# Patient Record
Sex: MALE | Race: BLACK OR AFRICAN AMERICAN
[De-identification: names, ages, dates, MRNs, and addresses within clinical notes are randomized per-mention and may not be internally consistent; named-entity substitution may affect disease eponyms.]

---

## 2019-07-22 ENCOUNTER — HOSPITAL ENCOUNTER (OUTPATIENT)
Dept: HOSPITAL 25 - ED | Age: 49
Setting detail: OBSERVATION
LOS: 1 days | Discharge: HOME | End: 2019-07-23
Attending: HOSPITALIST | Admitting: INTERNAL MEDICINE
Payer: SELF-PAY

## 2019-07-22 DIAGNOSIS — Z86.73: ICD-10-CM

## 2019-07-22 DIAGNOSIS — Z79.84: ICD-10-CM

## 2019-07-22 DIAGNOSIS — G81.91: ICD-10-CM

## 2019-07-22 DIAGNOSIS — R20.2: ICD-10-CM

## 2019-07-22 DIAGNOSIS — E11.649: Primary | ICD-10-CM

## 2019-07-22 DIAGNOSIS — E78.5: ICD-10-CM

## 2019-07-22 DIAGNOSIS — Z79.01: ICD-10-CM

## 2019-07-22 DIAGNOSIS — I10: ICD-10-CM

## 2019-07-22 DIAGNOSIS — Z79.82: ICD-10-CM

## 2019-07-22 LAB
ALBUMIN SERPL BCG-MCNC: 4.6 G/DL (ref 3.2–5.2)
ALBUMIN/GLOB SERPL: 1.4 {RATIO} (ref 1–3)
ALP SERPL-CCNC: 51 U/L (ref 34–104)
ALT SERPL W P-5'-P-CCNC: 39 U/L (ref 7–52)
ANION GAP SERPL CALC-SCNC: 10 MMOL/L (ref 2–11)
AST SERPL-CCNC: 31 U/L (ref 13–39)
BASOPHILS # BLD AUTO: 0 10^3/UL (ref 0–0.2)
BUN SERPL-MCNC: 11 MG/DL (ref 6–24)
BUN/CREAT SERPL: 15.3 (ref 8–20)
CALCIUM SERPL-MCNC: 9.7 MG/DL (ref 8.6–10.3)
CHLORIDE SERPL-SCNC: 97 MMOL/L (ref 101–111)
EOSINOPHIL # BLD AUTO: 0.1 10^3/UL (ref 0–0.6)
GLOBULIN SER CALC-MCNC: 3.3 G/DL (ref 2–4)
GLUCOSE SERPL-MCNC: 163 MG/DL (ref 70–100)
HCO3 SERPL-SCNC: 28 MMOL/L (ref 22–32)
HCT VFR BLD AUTO: 42 % (ref 42–52)
HGB BLD-MCNC: 14.2 G/DL (ref 14–18)
LYMPHOCYTES # BLD AUTO: 1.2 10^3/UL (ref 1–4.8)
MCH RBC QN AUTO: 30 PG (ref 27–31)
MCHC RBC AUTO-ENTMCNC: 34 G/DL (ref 31–36)
MCV RBC AUTO: 88 FL (ref 80–94)
MONOCYTES # BLD AUTO: 0.6 10^3/UL (ref 0–0.8)
NEUTROPHILS # BLD AUTO: 5.7 10^3/UL (ref 1.5–7.7)
NRBC # BLD AUTO: 0 10^3/UL
NRBC BLD QL AUTO: 0
PLATELET # BLD AUTO: 257 10^3/UL (ref 150–450)
POTASSIUM SERPL-SCNC: 3.4 MMOL/L (ref 3.5–5)
PROT SERPL-MCNC: 7.9 G/DL (ref 6.4–8.9)
RBC # BLD AUTO: 4.77 10^6 /UL (ref 4.18–5.48)
SODIUM SERPL-SCNC: 135 MMOL/L (ref 135–145)
TROPONIN I SERPL-MCNC: 0 NG/ML (ref ?–0.04)
WBC # BLD AUTO: 7.6 10^3/UL (ref 3.5–10.8)

## 2019-07-22 PROCEDURE — 96372 THER/PROPH/DIAG INJ SC/IM: CPT

## 2019-07-22 PROCEDURE — 84439 ASSAY OF FREE THYROXINE: CPT

## 2019-07-22 PROCEDURE — 70450 CT HEAD/BRAIN W/O DYE: CPT

## 2019-07-22 PROCEDURE — 96361 HYDRATE IV INFUSION ADD-ON: CPT

## 2019-07-22 PROCEDURE — 70547 MR ANGIOGRAPHY NECK W/O DYE: CPT

## 2019-07-22 PROCEDURE — 84443 ASSAY THYROID STIM HORMONE: CPT

## 2019-07-22 PROCEDURE — 93005 ELECTROCARDIOGRAM TRACING: CPT

## 2019-07-22 PROCEDURE — 81003 URINALYSIS AUTO W/O SCOPE: CPT

## 2019-07-22 PROCEDURE — 36415 COLL VENOUS BLD VENIPUNCTURE: CPT

## 2019-07-22 PROCEDURE — 82607 VITAMIN B-12: CPT

## 2019-07-22 PROCEDURE — 83605 ASSAY OF LACTIC ACID: CPT

## 2019-07-22 PROCEDURE — 83036 HEMOGLOBIN GLYCOSYLATED A1C: CPT

## 2019-07-22 PROCEDURE — 99284 EMERGENCY DEPT VISIT MOD MDM: CPT

## 2019-07-22 PROCEDURE — 80048 BASIC METABOLIC PNL TOTAL CA: CPT

## 2019-07-22 PROCEDURE — 86140 C-REACTIVE PROTEIN: CPT

## 2019-07-22 PROCEDURE — 93306 TTE W/DOPPLER COMPLETE: CPT

## 2019-07-22 PROCEDURE — 70544 MR ANGIOGRAPHY HEAD W/O DYE: CPT

## 2019-07-22 PROCEDURE — 95819 EEG AWAKE AND ASLEEP: CPT

## 2019-07-22 PROCEDURE — G0378 HOSPITAL OBSERVATION PER HR: HCPCS

## 2019-07-22 PROCEDURE — 70551 MRI BRAIN STEM W/O DYE: CPT

## 2019-07-22 PROCEDURE — 96360 HYDRATION IV INFUSION INIT: CPT

## 2019-07-22 PROCEDURE — 80061 LIPID PANEL: CPT

## 2019-07-22 PROCEDURE — 84484 ASSAY OF TROPONIN QUANT: CPT

## 2019-07-22 PROCEDURE — 85025 COMPLETE CBC W/AUTO DIFF WBC: CPT

## 2019-07-22 PROCEDURE — 80053 COMPREHEN METABOLIC PANEL: CPT

## 2019-07-22 RX ADMIN — ATORVASTATIN CALCIUM SCH MG: 10 TABLET, FILM COATED ORAL at 21:49

## 2019-07-22 NOTE — ED
HPI Diabetic





- HPI Summary


HPI Summary: 





This pt is a 47 y/o male presenting to [facility] via EMS for low blood glucose 

level and weakness and tingling. Pt reports for the past 3 days he has noticed 

his sugar level has been dropping and when it drops he feels weak. He notes 

today he felt weak and when he checked his sugar it was in the 50s. Pt states 

he usually feels well after eating and drinking juice. Additionally reports for 

more than 3 weeks he has felt his right side tingling and weakness, including 

his whole right arm, his right leg, and his right side of torso. Tingling is 

described as pins and needles. He notes this worsens when his sugar goes down. 

Denies problems urinating, chest pain, cough, SOB.


EMS gave the pt glucose gel and upon rechecking his blood glucose it was 295. 


PMHx includes TIA, DM type 2, HTN. 





- History Of Current Complaint


Chief Complaint: EDDiabeticProb


Time Seen by Provider: 07/22/19 15:37


Hx Obtained From: Patient


Onset/Duration: Lasting Days, Still Present


Timing: Days


Severity Currently: Moderate


Aggravating: Nothing


Alleviating: Nothing


Associated Signs & Symptoms: Negative


Related History: DM II





- Allergies/Home Medications


Allergies/Adverse Reactions: 


 Allergies











Allergy/AdvReac Type Severity Reaction Status Date / Time


 


No Known Allergies Allergy   Verified 07/22/19 16:04











Home Medications: 


 Home Medications





Amiloride HCl 10 mg PO DAILY 07/22/19 [History Confirmed 07/22/19]


Amlodipine Besylate [Norvasc] 10 mg PO DAILY 07/22/19 [History Confirmed 07/22/ 19]


Aspirin 81 mg CHEW TAB* [Aspirin Low Dose TAB*] 81 mg PO DAILY 07/22/19 [

History Confirmed 07/22/19]


Clopidogrel Bisulfate [Plavix] 75 mg PO DAILY 07/22/19 [History Confirmed 07/22/ 19]


Lisinopril 10 mg PO DAILY 07/22/19 [History Confirmed 07/22/19]


Metformin HCl 1,000 mg PO BID 07/22/19 [History Confirmed 07/22/19]


Simvastatin [Zocor 5 MG-] 10 mg PO DAILY 07/22/19 [History Confirmed 07/22/19]


glipiZIDE [Glipizide] 5 mg PO DAILY 07/22/19 [History Confirmed 07/22/19]











PMH/Surg Hx/FS Hx/Imm Hx


Endocrine/Hematology History: Reports: Hx Diabetes


Cardiovascular History: Reports: Hx Hypercholesterolemia, Hx Hypertension


Neurological History: Reports: Hx Transient Ischemic Attacks (TIA)





- Immunization History


Immunizations Up to Date: Yes


Infectious Disease History: No


Infectious Disease History: 


   Denies: Traveled Outside the [place] in Last 30 Days





- Family History


Known Family History: 


   Negative: Cardiac Disease





- Social History


Alcohol Use: Occasionally


Substance Use Type: Reports: None


Smoking Status (MU): Never Smoked Tobacco





Review of Systems


Negative: Fever


Negative: Chest Pain


Negative: Cough


Positive: no symptoms reported


Positive: Weakness, Paresthesia


All Other Systems Reviewed And Are Negative: Yes





Physical Exam





- Summary


Physical Exam Summary: 





Constitutional: Well-developed, Well-nourished, Alert. (-) Distressed


Skin: Warm, Dry


HENT: Normocephalic; Atraumatic


Eyes: Conjunctiva normal


Neck: Musculoskeletal ROM normal neck. (-) JVD, (-) Stridor, (-) Tracheal 

deviation


Cardio: Rhythm regular, rate normal, Heart sounds normal; Intact distal pulses; 

The pedal pulses are 2+ and symmetric. Radial pulses are 2+ and symmetric. (-) 

Murmur


Pulmonary/Chest wall: Effort normal. (-) Respiratory distress, (-) Wheezes, (-) 

Rales


Abd: Soft, (-) tenderness, (-) Distension, (-) Guarding, (-) Rebound


Musculoskeletal: (-) Edema


Lymph: (-) Cervical adenopathy


Neuro: Alert, Oriented x3. No neurological deficits. 


Psych: Mood and affect Normal


Triage Information Reviewed: Yes


Vital Signs On Initial Exam: 


 Initial Vitals











Temp Pulse Resp BP Pulse Ox


 


 99.1 F   98   18   134/85   99 


 


 07/22/19 15:28  07/22/19 15:28  07/22/19 15:28  07/22/19 15:28  07/22/19 15:28











Vital Signs Reviewed: Yes





Diagnostics





- Vital Signs


 Vital Signs











  Temp Pulse Resp BP Pulse Ox


 


 07/22/19 15:28  99.1 F  98  18  134/85  99














- Laboratory


Result Diagrams: 


 07/22/19 16:21





 07/22/19 16:21


Lab Statement: Any lab studies that have been ordered have been reviewed, and 

results considered in the medical decision making process.





- CT


  ** Brain CT


CT Interpretation Completed By: Radiologist


Summary of CT Findings: IMPRESSION: 1. No acute intracranial abnormality (MRI 

is more sensitive for acute infarct). 2. Mild mucosal disease of the right 

sphenoid sinus. Dr. Reyes has reviewed this report.





- EKG


  ** 16:23


Cardiac Rate: NL - at 88 bpm


EKG Rhythm: Sinus Rhythm


Summary of EKG Findings: Normal [place]. Normal QRS. Normal QTc.  STs are elevated 

in V2, V3.  T waves are flattened in aVF.  Nonspecific EKG.





Re-Evaluation





- Re-Evaluation


  ** First Eval


Re-Evaluation Time: 17:57


Comment: Discussed admission plan with the pt. He understands and agrees.





Diabetic Course/Dx





- Course


Assessment/Plan: Pt is a 47 y[facility] blood 

glucose level for the past 3 days and right sided weakness and tingling for 

more than 3 weeks. PMHx includes DM type 2, HTN, TIA.  POC glucose was 320.  

Test results remarkable for potassium of 97, glucose of 163, lactic acid of 

3.8. Urine shows 3+ glucose.  Brain CT shows 1. No acute intracranial 

abnormality (MRI is more sensitive for acute infarct). 2. Mild mucosal disease 

of the right sphenoid sinus.  In the ED course the pt was given IV fluids and 

potassium chloride.  Discussed the case with Dr. Fernández, neurologist, who 

recommends admission. Discussed with Dr. Georges, hospitalist, who accepted the 

pt for admission.





- Diagnoses


Provider Diagnoses: 


 Stroke








- Physician Notifications


Discussed Care Of Patient With: Sanaz Fernández


Time Discussed With Above Provider: 17:52


Instructed by Provider To: Other - Discussed with Dr. Fernández, neurologist, who 

recommends admission to the hospitalist. [18:56] Discussed with Dr. Georges, 

hospitalist, who accepted the pt for admission.





Discharge





- Sign-Out/Discharge


Documenting (check all that apply): Patient Departure - Admit to [facility]


Patient Received Moderate/Deep Sedation with Procedure: No





- Discharge Plan


Condition: Stable


Disposition: ADMITTED TO [facility]


Referrals: 


No Primary Care Phys,NOPCP [Primary Care Provider] - 





- Billing Disposition and Condition


Condition: STABLE


Disposition: Admitted to [facility]





- Attestation Statements


Document Initiated by Scribe: Yes


Documenting Scribe: Herlinda Bonner


Provider For Whom Scribe is Documenting (Include Credential): Amanda Sams MD


Scribe Attestation: 


I, Herlinda Bonner, scribed for Amanda Magaña MD on 07/22/19 at 1825. 


Scribe Documentation Reviewed: Yes


Provider Attestation: 


The documentation as recorded by the scribe, Herlinda Bonner accurately reflects 

the service I personally performed and the decisions made by me, Amanda Magaña MD


Status of Scribe Document: Viewed

## 2019-07-22 NOTE — HP
*** ADDENDUM NOW INCLUDED ON THIS REPORT ***



HISTORY AND PHYSICAL:

 

DATE OF ADMISSION:  07/22/19

 

PROVIDER:  Archana Wells NP

 

PRIMARY CARE PROVIDER:  [place], [place].

 

ATTENDING PHYSICIAN WHILE IN THE HOSPITAL:  Dr. Carmine Alston * (dictated by 
Archana Wells NP).

 

CHIEF COMPLAINT:

1.  Hypoglycemia.

2.  Right-sided tingling.

 

HISTORY OF PRESENT ILLNESS:  Mr. Bojorquez is a 48-year-old gentleman with past 
medical history significant for type 2 diabetes, hypertension, hyperlipidemia, 
history of TIA and questionable CVA in December 2016 with residual right-sided 
heaviness per the patient, who presented to the emergency room for evaluation 
of hypoglycemia and increased tingling and heaviness on the right side.  The 
patient reports that his care visiting from out of [facility], he currently resides 
in [place].  He reports that 3 days ago, he started having episodes of low 
blood sugar.  He states that approximately in noon every day he becomes weak 
and dizzy and when he checks his blood sugar, his blood sugar is in the 50s.  
He reports that he takes sugar and candy and drinks soda and his blood sugar 
would rise and then the symptoms would resolve.  Patient reports that again 
today, he became dizzy, felt weak.  He checked his blood sugar, it was 50.  
Again, he ate candies and drank soda and his sugar was 120.  Patient reports 
that again he felt dizzy.  He was talking on the phone, he felt like he was 
going to pass out.  He checked his blood sugar and it was 50.

 

The patient also reports that he has had twitching and a sensation of tingling 
and heaviness on the right side that has been significantly worse for the last 
4 to 5 weeks.  Due to these symptoms, he presented to the emergency room for 
further evaluation.

 

PAST MEDICAL HISTORY:  Significant for:

1.  Type 2 diabetes.

2.  Hypertension.

3.  Hyperlipidemia.

4.  History of TIA in December 2016.

 

PAST SURGICAL HISTORY:  None.

 

HOME MEDICATIONS:  Include:

1.  Metformin 1000 mg p.o. b.i.d.

2.  Glimepiride 4 mg p.o. daily.

3.  Amiloride 10 mg p.o. daily.

4.  Amlodipine 10 mg p.o. daily.

5.  Plavix 75 mg p.o. daily.

6.  Aspirin 81 mg p.o. daily.

7.  Lisinopril 10 mg p.o. at h.s.

8.  Simvastatin 10 mg p.o. at h.s.

9.  Multivitamin 1 tab p.o. daily.

 

ALLERGIES:  No known drug allergies.

 

FAMILY HISTORY:  Mother and father both with a history of hypertension.  Mother 
with diabetes.  No reported history of cancer.

 

SOCIAL HISTORY:  The patient denies any tobacco or illicit drug use.  He does 
report a rare alcohol use. He is .  Surrogate decision maker in the 
event he is unable to make his own decisions is his wife or his sister.  He is 
a full code.

 

REVIEW OF SYSTEMS:  The patient denies any fever, chills, unintended weight 
loss. Denies any chest pain or edema.  Denies any cough, hemoptysis, or 
shortness of breath.  No nausea, vomiting, diarrhea, or abdominal pain, 
hematuria, or dysuria. He denies any focal weakness.  He does complain of 
tingling and heaviness feeling to the right side of his body including arm and 
leg, but no sensory loss. Sensation is intact to the right side of his body in 
full.  He does have full range of motion.  Denies any visual changes and 
dizziness.  Denies any headache.  Denies any dysphagia.  Denies any arthralgias
, myalgias, rashes, lesions, or open sores. Denies any psychosis or anxiety.

 

                               PHYSICAL EXAMINATION

 

GENERAL: At this time, Mr. Bojorquez is a 48-year-old gentleman.  He is alert and 
oriented, resting on the stretcher in the emergency room.  He is in no acute 
distress.

 

VITAL SIGNS:  Blood pressure 130/79, heart rate 88, respirations 19, O2 
saturation 99%, room air, temperature 99.1.

 

HEENT:  Head is atraumatic, normocephalic.  Eyes: EOMs are intact.  Sclerae 
anicteric and not pale. Oral mucosa appeared to be moist.

 

NECK:  Supple.

 

LUNGS:  Clear to auscultation bilaterally.  No wheezes, rales, or rhonchi.

 

CARDIAC:  S1, S2. Regular rate and rhythm.  No murmurs, rubs, or gallops.

 

ABDOMEN:  Soft, nontender.  Bowel sounds are present x4.

 

EXTREMITIES:  He is able to move all 4 extremities.  There is no clubbing or 
cyanosis.  Pedal pulses are +2 bilaterally.  He does not have any edema.

 

NEUROLOGIC:  He is awake, alert, oriented x3.  Speech is clear.  Thought 
process intact.  There is no gross focal deficits.  There is no pronator drift.
  There is no leg drift.  Push-pull is intact.  Hand  are equal.  Smile is 
equal.  Tongue is midline.  There is no facial asymmetry.  Finger-to-nose is 
intact.  There is no gross focal neuro deficits.

 

SKIN: Intact.

 

 LABORATORY DATA AND DIAGNOSTIC STUDIES:  WBCs are 7.6, RBC is 4.77, hemoglobin 
14.2, hematocrit of 42, platelet count 257.  Sodium 135, potassium 3.4, 
chloride 107, carbon dioxide is 28, anion gap is 10, BUN was 11, creatinine 0.72
, glucose was 163.  Repeat in the emergency room 113.  Lactic acid initially 
was 3.8, calcium 9.7.  ASTs were 31, ALTs were 39, alkaline phosphatase was 51. 
Troponin was 0.00. C-reactive protein was less than 1.  Urine was within normal 
limits with the exception to glucose, was 3+.

 

He had a CT of the brain, radiologist's impression:  No acute intracranial 
abnormality.  Mild mucosal disease on the right sphenoid sinus.

 

He had an electrocardiogram, which showed sinus rhythm at a rate of 88.  No ST 
or T wave changes.

 

ASSESSMENT AND PLAN:  Mr. Bojorquez is a 48-year-old gentleman with a past medical 
history significant for hypertension, hyperlipidemia, type 2 diabetes, history 
of transient ischemic attack, questionable cerebrovascular accident in 2016, 
who presented to the emergency room with complaints of hypoglycemia and 
tingling and heaviness on the right side of his body.  He will be admitted to 
rule out cerebrovascular accident under observation.

 

1.  Hypoglycemia.  The patient has had several episodes of hypoglycemia over 
the past 3 days and 2 episodes of blood sugars in the 50s today.  Initially, on 
admission to the emergency room, he had a blood sugar of 320 and repeat at 07:
30 this evening, his blood sugar was 113.  He will place him on q.2 hour Accu-
Cheks and monitor him overnight.  If his blood sugar should continue to drop, 
we will place him on D5 and monitor his blood sugars closely.  I will hold his 
metformin and glimepiride  at this time.  I am not going to place him on a 
lispro sliding scale as the patient has had several episodes of hypoglycemia.  
If he should become hyperglycemic, we will consider ordering Lispro sliding 
scale at that time.

2.  Right-sided heaviness and tingling.  We will do a transient ischemic attack 
workup.  I have consulted Neurology, Dr. Fernández, who will see the patient see 
the in consultation tomorrow.  He has requested an MRA of the head and neck and 
an MRI of the brain, which have been ordered.  I will get a transthoracic 
echocardiogram with bubble study.  We will place him on neuro checks q.4 hours. 
We will continue him on Plavix 75, aspirin 81 mg and simvastatin at 10 mg p.o. 
daily.

3.  Hypertension.  The patient will continue on amiloride, lisinopril, and 
amlodipine as per his home medications.

4.  Type 2 diabetes.  Patient will have Accu-Cheks q.2 hours.  Due to 
hypoglycemia, I will hold his metformin and glimepiride at this time.  I will 
hold off on placing the patient on Lispro sliding scale as the patient has had 
several episodes of hypoglycemia.  I will add a hemoglobin A1c for the morning 
as well.

5.  High cholesterol.  The patient will continue on simvastatin 10 mg p.o. 
daily. We will repeat a lipid profile in the a.m.

6.  History of transient ischemia attack, possible cerebrovascular accident, 
the patient will continue on Plavix and aspirin as previously prescribed.

7.  FEN:  He can have a consistent carb diet.

8.  Code status:  He is full code.

9.  DVT prophylaxis:  I will place him on Lovenox 40 mg subcu daily.

 

TIME SPENT:  Time spent on this admission was approximately 60 minutes, greater 
than half that time was spent at the bedside reviewing the events leading thus 
far to his hospitalization, performing physical exam, reviewing my plan of 
care. I have discussed this with my attending, Dr. Carmine Alston.  He is in 
agreement with my plan.

 

____________________________________ ARCHANA WELLS NP

 

 

ADDENDUM:  



Lactic acidosis.  The patient does have an elevated lactic acid.  The patient 
has no current signs of underlying infection.  His urine was within normal 
limits.  He has no cough or congestion.  No fevers.  I will add on a chest x-ray
, but at this time has no acute source of infection.  I suspect this could be 
related to his hypoglycemia.

 

 ____________________________________ ARCHANA DEB, NP

 



[id number] #: [id number]

[id number] #: [id number]

[facility]

## 2019-07-22 NOTE — HP
HISTORY AND PHYSICAL:

 

ADDENDUM:  Lactic acidosis.  The patient does have an elevated lactic acid.  
The patient has no current signs of underlying infection.  His urine was within 
normal limits.  He has no cough or congestion.  No fevers.  I will add on a 
chest x-ray, but at this time has no acute source of infection.  I suspect this 
could be related to his hypoglycemia.

 

 ____________________________________ BIN BOYD, NP

 

[id number] #: [id number]

[facility]

## 2019-07-23 ENCOUNTER — HOSPITAL ENCOUNTER (EMERGENCY)
Dept: HOSPITAL 25 - ED | Age: 49
Discharge: HOME | End: 2019-07-23
Payer: SELF-PAY

## 2019-07-23 VITALS — DIASTOLIC BLOOD PRESSURE: 81 MMHG | SYSTOLIC BLOOD PRESSURE: 132 MMHG

## 2019-07-23 VITALS — DIASTOLIC BLOOD PRESSURE: 68 MMHG | SYSTOLIC BLOOD PRESSURE: 111 MMHG

## 2019-07-23 DIAGNOSIS — E11.9: ICD-10-CM

## 2019-07-23 DIAGNOSIS — E78.00: ICD-10-CM

## 2019-07-23 DIAGNOSIS — R55: Primary | ICD-10-CM

## 2019-07-23 DIAGNOSIS — I10: ICD-10-CM

## 2019-07-23 DIAGNOSIS — Z79.899: ICD-10-CM

## 2019-07-23 LAB
ANION GAP SERPL CALC-SCNC: 7 MMOL/L (ref 2–11)
BUN SERPL-MCNC: 7 MG/DL (ref 6–24)
BUN/CREAT SERPL: 11.9 (ref 8–20)
CALCIUM SERPL-MCNC: 9.1 MG/DL (ref 8.6–10.3)
CHLORIDE SERPL-SCNC: 107 MMOL/L (ref 101–111)
CHOLEST SERPL-MCNC: 111 MG/DL
GLUCOSE SERPL-MCNC: 119 MG/DL (ref 70–100)
HCO3 SERPL-SCNC: 26 MMOL/L (ref 22–32)
HDLC SERPL-MCNC: 45.4 MG/DL
POTASSIUM SERPL-SCNC: 4 MMOL/L (ref 3.5–5)
SODIUM SERPL-SCNC: 140 MMOL/L (ref 135–145)
TRIGL SERPL-MCNC: 97 MG/DL
TSH SERPL-ACNC: 1.33 MCIU/ML (ref 0.34–5.6)

## 2019-07-23 PROCEDURE — 99283 EMERGENCY DEPT VISIT LOW MDM: CPT

## 2019-07-23 PROCEDURE — 93005 ELECTROCARDIOGRAM TRACING: CPT

## 2019-07-23 RX ADMIN — ATORVASTATIN CALCIUM SCH MG: 10 TABLET, FILM COATED ORAL at 08:53

## 2019-07-23 NOTE — ECHO
*[facility]*

[place] [address]

Phone: [phone]

Fax #: [phone]



-------------------------------------------------------------------

Transthoracic Echocardiogram



Patient: Gaurav Bojorquez                        MRN:        [id number]

:     1970                         Study Date: 2019

Age:     48                                 Accession#: [id number]

Gender:  M                                  HR:         57 bpm

Height:  71 in /180.3 cm                    BSA:        2.21 m^2

Weight:  209.6 lb /95.3 kg                  BMI:        29.3 kg/m^2



*Sonographer: * Melinda Kilpatrick RDCS RN

 

*Referring Physician: * Archana Wells

*Reading Physician: * Luis Eduardo Perry MD



-------------------------------------------------------------------

Indications:   TIA.



-------------------------------------------------------------------

History:   Transient ischemic attack.  Risk factors:  Hypertension.

Diabetes mellitus. Dyslipidemia.



-------------------------------------------------------------------

Conclusions



Summary:



- Left ventricle: Systolic function is normal. The estimated

  ejection fraction is 55-60%. Wall motion is normal; there are no

  regional wall motion abnormalities.

- Atrial septum: There is a patent foramen ovale by agitated

  contrast. The bubble study is positive. Images 1-3.

- Mitral valve: There is trace regurgitation.

- Aortic valve: There is no evidence of stenosis. There is trace

  regurgitation.

- Tricuspid valve: There is mild regurgitation.

- Pericardium, extracardiac: There is no pericardial effusion.

- Pulmonary arteries: Systolic pressure is at the upper limits of

  normal, estimated to be 35 mm Hg.

- Study data: No prior study is available for comparison.



-------------------------------------------------------------------

Study data:  Transthoracic echocardiogram.  Procedure:

Transthoracic echocardiography was performed. Image quality was

fair. Intravenous agitated saline was administered. A bubble study

was performed on Images 1-3.  Complete 2D, spectral Doppler, and

color flow Doppler.  Location:  Bedside.  Patient status:

Observation. Patient room number: 446-01. No prior study is

available for comparison.  Rhythm:  Bradycardia.



-------------------------------------------------------------------

Findings



Left ventricle:  The cavity size is normal. Wall thickness is

mildly increased. Systolic function is normal. The estimated

ejection fraction is 55-60%. Wall motion is normal; there are no

regional wall motion abnormalities. Left ventricular diastolic

function parameters are normal.

Right ventricle:  The cavity size is normal. Systolic function is

normal.

Left atrium:  The atrium is normal in size.

Right atrium:  The atrium is normal in size.

Atrial septum:  There is a patent foramen ovale by agitated

contrast. The bubble study is positive. Images 1-3.

Mitral valve:  The leaflets are mildly thickened.  There is no

evidence of stenosis.   There is trace regurgitation.

Aortic valve:   The valve is trileaflet.    There is no evidence of

stenosis.   There is trace regurgitation.

Tricuspid valve:   The valve is structurally normal.    There is no

evidence of stenosis.   There is mild regurgitation.

Pulmonic valve:    The valve is structurally normal.    There is no

evidence of stenosis.   There is mild regurgitation.

Aorta:  Aortic root: The aortic root is appears normal.

Ascending aorta: The ascending aorta is not dilated.

Aortic arch: The aortic arch is not dilated.

Pericardium:  There is no pericardial effusion.

Pulmonary arteries:

The main pulmonary artery is normal-sized. Systolic pressure is at

the upper limits of normal, estimated to be 35 mm Hg.

Systemic veins:

Inferior vena cava: The vessel is mildly dilated. There is (&gt;= 50%)

respiratory change in the IVC dimension.



-------------------------------------------------------------------

Measurements



 Left ventricle              Value        Ref       Aortic valve              Value       Ref

 FREDY, LAX                    4.5   cm     4.2 -     Peak v, S                 1.6   m/sec -----

                                          5.8       VTI, S                    37.4  cm    -----

 ESD, LAX                    3.2   cm     2.5 -     Mean grad, S              6.6   mm Hg -----

                                          4.0       Peak grad, S              10.2  mm Hg -----

 FS, LAX                     29    %      25 - 43   LVOT/AV, VTI ratio        0.7         -----

 PW, ED              (H)     1.2   cm     0.6 -

                                          1.0       Mitral valve              Value       Ref

 IVS/PW, ED                  0.97         --------  Peak E                    1.03  m/sec -----

 E&apos;, lat joe, TDI            12.0  cm/sec &gt;=10.0    Peak A                    0.82  m/sec ---
--

 E/e&apos;, lat joe, TDI          9            --------  Decel time                235   ms    -----

 E&apos;, med joe, TDI            12.0  cm/sec &gt;=7.0     Peak grad, D              4.3   mm Hg ---
--

 E/e&apos;, med joe, TDI          9            --------  Peak E/A ratio            1.25        -----

 E&apos;, avg, TDI                12.0  cm/sec --------

 E/e&apos;, avg, TDI              9            &lt;=14      Pulmonic valve            Value       Ref


                                                    Peak v, S                 1.05  m/sec -----

 LVOT                        Value        Ref       Peak grad, S              4.4   mm Hg -----

 Peak ambreen, S                 1.23  m/sec  --------

 VTI, S                      26.4  cm     --------  Tricuspid valve           Value       Ref

 Peak grad, S                6     mm Hg  --------  TR peak v                 2.6   m/sec &lt;=2.8

 Mean grad, S                4     mm Hg  --------  Peak RV-RA grad, S        27    mm Hg -----

 

 Ventricular septum          Value        Ref       Aortic root               Value       Ref

 IVS, ED             (H)     1.2   cm     0.6 -     Root diam                 3.3   cm    &lt;4.3

                                          1.0

                                                    Ascending aorta           Value       Ref

 Right ventricle             Value        Ref       AAo AP diam, S            3.7   cm    -----

 FREDY, LAX                    2.8   cm     --------

 FREDY minor ax, A4C   (H)     3.9   cm     1.9 -     Aortic arch               Value       Ref

 mid                                      3.5       Arch diam                 2.9   cm    -----

 Pressure, S                 35    mm Hg  --------

                                                    Decending aorta           Value       Ref

 Left atrium                 Value        Ref       Jomar peak ambreen              0.87  m/sec -----

 SI dim ES, LAX              3.7   cm     --------

 ML dim, A4C                 4.1   cm     --------  Pulmonary artery          Value       Ref

 SI dim, A4C                 5.3   cm     --------  Pressure, S               30.6  mm Hg -----

 Vol, ES, 2-p                60    ml     --------

 Vol/bsa, ES, 2-p            27    ml/m^2 16 - 34   Inferior vena cava        Value       Ref

                                                    Diam                      2.3   cm    -----

 Right atrium                Value        Ref

 ML dim, ES, A4C             3.8   cm     2.6 -

                                          4.4

 SI dim, ES, A4C             5.1   cm     3.4 -

                                          5.3

 Estimated RAP               8     mm Hg  --------

 

Legend:

(L)  and  (H)  pamela values outside specified reference range.



Prepared and electronically signed by



Luis Eduardo Perry MD

2019 11:53

## 2019-07-23 NOTE — PN
Hospitalist Progress Note


Date of Service: 07/23/19





Called by nursing for low blood sugar of 59 , patient was given food and repeat 

blood sugar was 126.  Side consult to Dr. Miranda - medications reviewed and 

advised that this is a delayed reaction from his glimepiride.  Reports that 

patient could experience low blood sugars over the next couple of days and 

should eat complex carbohydrates and check his blood sugar.





Patient was advised never to take glimepiride do to having hypogylcemia as per 

Dr. Miranda recommendation





Patient was advised to eat complex carbohydrates, such as peanut butter, 

proteins and not simple sugars such as candies.    patient verbalized 

understanding.





Patient advised to stop both glimepiride and metformin

## 2019-07-23 NOTE — DS
DISCHARGE SUMMARY:

 

DATE OF ADMISSION:  07/22/19

 

DATE OF DISCHARGE:  07/23/19

 

PROVIDER:  Bin Wells NP.

 

PRIMARY CARE PROVIDER:  Out of town in the [facility].

 

ATTENDING PHYSICIAN WHILE IN THE HOSPITAL:  Dr. Valery Jane * (dictated by 
Bin Wells NP).

 

PRIMARY DIAGNOSES:

1.  Hypoglycemia.

2.  Right-sided heaviness.

 

SECONDARY DIAGNOSES:

1.  Type 2 diabetes.

2.  Hypertension.

3.  Hyperlipidemia.

4.  History of transient ischemic attack/cerebrovascular accident in 2016.

 

STUDIES COMPLETED WHILE IN THE HOSPITAL:  He had a transthoracic echocardiogram 
which showed left systolic function was normal.  Estimated ejection fraction 
was 55% to 60%.  There was no regional wall motion abnormalities.  In the 
atrial septum, there is a patent foramen ovale, positive bubble study, mitral 
valve trace regurgitation.  There is no evidence of stenosis in the aortic 
valve.  There was trace regurgitation.  Tricuspid valve, there was mild 
regurgitation.  No pericardial effusion. Pulmonary artery systolic pressure was 
upper limits of normal. He had a CT of the brain; radiologist impression:  No 
acute intracranial abnormalities.  He had an MRI of the head and neck; 
radiologist impression:  No hemodynamically significant stenosis or large 
vessel occlusion.  He had an MRI of the neck, no evidence of hemodynamically 
significant  stenosis.  He had an MRI of the brain, no acute intracranial 
abnormalities were seen.

 

DISCHARGE MEDICATIONS:  Discontinued home medications:

1.  Glimepiride 5 mg p.o. daily.

2.  Metformin 1000 mg p.o. b.i.d. Continued home medications:

1.  Amlodipine 10 mg p.o. daily.

2.  Amiloride 10 mg p.o. daily.

4.  Simvastatin 10 mg p.o. daily.

5.  Lisinopril was decreased to 5 mg p.o. daily.

6.  Clopidogrel 75 mg p.o. daily.

7.  Aspirin 81 mg p.o. daily.

8.  Acetaminophen 650 mg p.o. q.6 hours as needed for pain.

 

HISTORY OF PRESENT ILLNESS AND HOSPITAL COURSE:  Mr. Bojorquez is a 48-year-old 
gentleman with a past medical history significant for  hypertension, 
hyperlipidemia, type 2 diabetes and history of TIA/CVA in 2016, who presented 
to the emergency room with complaints of dizziness, weakness and low blood 
sugars, also complaining of right-sided heaviness x4 to 5 weeks.  The patient 
reports that he is from out of town and currently resides in [place].  He is 
here on vacation. He reports that over the past 3 days he has been having 
episodes of low blood sugars.  He states that this usually occurs at noon every 
day and he checks his blood sugars and that is in the 50s, he takes candy and 
soda and his blood sugar rises and the symptoms do resolve.  The patient 
reports that again today he felt dizzy and weak.  He checked his blood sugar 
again, it was 50.  He ate candies and drink soda.  The sugar was up to 120.  
Due to the recurrence of these symptoms and feeling dizziness along with the 
right side of his body feeling heavy, he presented to the emergency room for 
further evaluation.

 

While in the hospital, the patient had q.2 hour Accu-Chek and his blood sugars 
remained within normal limits ranging from 87 to 250.  He did this afternoon at 
1623 have a low blood sugar of 59.  Repeat was 139 and repeat an hour later was 
175.  I did contact Dr. Stone from Endocrinology to discuss the patient's 
medications who advised that the hypoglycemia was likely a result of his 
glimepiride and that his metformin and glimepiride should be discontinued.  At 
the time of discharge, the patient reports that he is feeling fine.  He denies 
any dizziness or weakness.  At this time, he is stable for discharge home.

 

REVIEW OF SYSTEMS:  The patient denies any fever, chills, nausea, vomiting or 
diarrhea.  Denies any abdominal pain.  Denies any chest pain or shortness of 
breath.  He does continue to complain of heaviness on the right side that is 
unresolved.  He denies any urinary frequency, urgency.

 

PHYSICAL EXAMINATION:  General:  At this time, Mr. Bojorquez is alert and oriented.
  He is in no acute distress.  Vital Signs:  Blood pressure 111/68, heart rate 
is 65, respirations 17, O2 saturation 97%, temperature is 98.1.  HEENT:  Head 
is atraumatic, normocephalic.  Eyes:  EOMs are intact.  Sclerae anicteric and 
not pale.  Oral mucosa appeared to be moist.  Neck is supple.  Lungs are clear 
to auscultation bilaterally.  No wheezes, rales or rhonchi.  Cardiac:  S1, S2, 
regular rate and rhythm.  No murmurs, rubs or gallops.  Abdomen:  Soft and 
nontender. Bowel sounds are present x4.  Neurologic:  He is awake, alert and 
oriented x3.  His speech is clear.  Thought process is intact.  Handgrips are 
equal. Tongue is midline.  Smile is equal.  There is no pronator drift.  There 
is no leg drift. There is equal strength in all 4  extremities.  There are no 
gross focal deficits. Skin is intact.

 

At this time, Mr. Bojorquez is stable for discharge home.

 

DISCHARGE PLAN:  Mr. Bojorquez will be discharged home.  Activity as tolerated.

 

1.  Hypoglycemia.  The patient has been instructed to discontinue metformin and 
glimepiride.  I did review these medications with him.  I did instruct the 
patient that he may have subsequent low blood sugars over the next few days as 
a delayed reaction of the glimepiride.  I instructed the patient to eat complex 
carbohydrates such as peanut butter and toast, chicken, and stay away from 
cakes and juices as this will cause fast rise and also cause him to have low 
blood sugars.  I instructed the patient to check his blood sugar every 4 hours 
and to eat 3 meals a day with snacks in between over the next few days.

2.  Hypertension.  The patient should continue on his amlodipine and amiloride.
  I did decrease his lisinopril to 5 mg at night.  He should maintain a blood 
pressure between 120 and 140.  He should follow up with his primary care doctor 
for further management and decreasing his blood pressure medications.

3.  History of transient ischemic attack versus cerebrovascular accident.  The 
patient was seen in consultation by Neurology during this hospitalization, who 
recommended the patient continue on Plavix and aspirin for 30 days and then 
monotherapy with Plavix 75 mg p.o. daily after the 30 days.

4.  Patent foramen ovale.  The patient does have a patent foramen ovale that 
was seen on echocardiogram.  He should follow up with Cardiology within 1 month 
for further recommendations on possible closure of the patent foramen ovale.

 

The patient was instructed to return to the emergency room for further episodes 
of uncontrolled hypoglycemia, chest pain, shortness of breath, dizziness, 
weakness on one side or any other concerning symptoms.

 

I did discuss with my attending, Dr. Valery Jane.  She is in agreement with 
my plan.

 

____________________________________ BIN WELLS, NP

 

[id number]/[facility] #: [id number]

[facility]

## 2019-07-23 NOTE — EEG
ELECTROENCEPHALOGRAPHY:

 

DATE OF SERVICE:  19 - ROOM #446

 

DATE READ:  19

 

ORDERED BY:  Sanaz Fernández MD

 

INDICATION:  Mr. Bojorquez is a 48-year-old man with symptoms of stiffness in the 
right upper and lower extremities.  This EEG was obtained to evaluate for 
epileptiform abnormalities or electrographic seizures.

 

MEDICATIONS:

1.  Lovenox.

2.  Prinivil.

3.  Midamor.

4.  Norvasc.

5.  Aspirin.

6.  Lipitor.

7.  Plavix.

8.  Tylenol.

 

DURATION OF THE [id number]

 

REPORT:  The waking background showed appropriate organization with clearly 
defined anterior-posterior voltage and frequency gradients.  There was a well-
defined posterior dominant rhythm of 10 Hz which was symmetrical and showed 
normal reactivity.  Anteriorly, there was an expected pattern of lower voltage, 
irregular, mixed fast frequency.  Attenuation of the occipital rhythm 
accompanied drowsiness. There were irregular vertex waves, high amplitude K-
complex and sleep spindles seen throughout the recording.  There were 
predominantly bifrontal theta and delta slowing seen throughout the recording 
that was thought to be related to K- complexes.

 

Single electrode EKG showed normal sinus rhythm with a rate of 70 beats per 
minute. Throughout the recording, there were no epileptiform discharges.

 

CLINICAL IMPRESSION:  This is an essentially normal awake and sleep EEG with 
irregular K-complex and sleep spindles seen throughout the recording and 
possible high amplitude delta waves seen in the frontal region.  These high 
amplitude delta waves were seen predominantly during sleep state and could be a 
normal finding, but underlying frontal intermittent rhythmic delta activity 
that can be seen with mild global encephalopathy cannot be entirely excluded.  
Clinical correlation is recommended.

 

[id number]/[facility] #: [id number]

[facility]

## 2019-07-23 NOTE — CONSULT
Consult


Consult: 


PFO seen on 2D Echo.  Refer patient to outpatient cardiology to discuss future 

options for PFO closure, especially if he continues to have symptoms of TIA/

troke.   He does have history of a small stroke in the past.  He did not have a 

new stroke this admission and I suspect recrudescence of his previous 

neurological deficits.   He has no clinical symptoms of a DVT (no calf pain and 

is active).   Continue DAPT for 30 days then discontinue aspirin. 





Encouraged him to come back to the ED immediately if he has new weakness, 

paresthesia, headache, visual disturbance, or slurred speech. 





Sanaz Fernández MD

## 2019-07-23 NOTE — ED
Syncope/Near Syncope





- HPI Summary


HPI Summary: 


The pt is 48 year old male presenting to [facility] c/o syncopal episode 3 hours 

PTA. He was discharged from [facility] earlier today but returned after having a 

syncopal episode at home. He was eating dinner at home and after drinking a 

bottle of coke he passed out for about 15 minutes. The pt was able to remember 

this episode.  He mentions that he had a syncopal episode yesterday as well but 

today was different because he started seeing black. He has not taken his 

blood sugar medication today and has not been taking it some time on the advice 

of his physician. 








- History Of Current Complaint


Chief Complaint: EDSyncope


Time Seen by Provider: 07/23/19 21:04


Hx Obtained From: Patient


Onset/Duration: Sudden Onset, Lasting Minutes - 15 minutes, Resolved


Timing: Intermittent Episode Lasting - minutes


Context: Other - "passed out"


Associated Signs And Symptoms: Other - Positive - "seeing black", LOC





- Allergies/Home Medications


Allergies/Adverse Reactions: 


 Allergies











Allergy/AdvReac Type Severity Reaction Status Date / Time


 


No Known Allergies Allergy   Verified 07/23/19 20:56














PMH/Surg Hx/FS Hx/Imm Hx


Endocrine/Hematology History: Reports: Hx Diabetes


Cardiovascular History: Reports: Hx Hypercholesterolemia, Hx Hypertension, 

Other Cardiovascular Problems/Disorders - high cholesterol


   Denies: Hx Pacemaker/ICD


Sensory History: Reports: Hx Contacts or Glasses


   Denies: Hx Hearing Aid


Opthamlomology History: Reports: Hx Contacts or Glasses


Neurological History: Reports: Hx Transient Ischemic Attacks (TIA)


Psychiatric History: 


   Denies: Hx Panic Disorder


Infectious Disease History: No


Infectious Disease History: 


   Denies: Traveled Outside the [place] in Last 30 Days





- Family History


Known Family History: 


   Negative: Cardiac Disease





- Social History


Alcohol Use: Occasionally


Substance Use Type: Reports: None


Smoking Status (MU): Never Smoked Tobacco





Review of Systems


Negative: Fever


Positive: Syncope


All Other Systems Reviewed And Are Negative: Yes





Physical Exam





- Summary


Physical Exam Summary: 


VITAL SIGNS: Reviewed.


GENERAL:  Patient is a well-developed and nourished male who is lying 

comfortable in the stretcher. Patient is not in any acute respiratory distress.


HEAD AND FACE: No signs of trauma. No ecchymosis, hematomas or skull 

depressions. No sinus tenderness.


EYES: PERRLA, EOMI x 2, No injected conjunctiva, no nystagmus.


EARS: Hearing grossly intact. Ear canals and tympanic membranes are within 

normal limits.


MOUTH: Oropharynx within normal limits.


NECK: Supple, trachea is midline, no adenopathy, no JVD, no carotid bruit, no c-

spine tenderness, neck with full ROM


CHEST: Symmetric, no tenderness at palpation


LUNGS: Clear to auscultation bilaterally. No wheezing or crackles.


CVS: Regular rate and rhythm, S1 and S2 present, no murmurs or gallops 

appreciated.


ABDOMEN: Soft, non-tender. No signs of distention. No rebound no guarding, and 

no masses palpated. Bowel sounds are normal.


EXTREMITIES: FROM in all major joints, no edema, no cyanosis or clubbing.


NEURO: Alert and oriented x 3. No acute neurological deficits. Speech is normal 

and follows commands.


SKIN: Dry and warm





Triage Information Reviewed: Yes


Vital Signs On Initial Exam: 


 Initial Vitals











Temp Pulse Resp BP Pulse Ox


 


 98.4 F   86   16   137/84   98 


 


 07/23/19 20:54  07/23/19 20:54  07/23/19 20:54  07/23/19 20:54  07/23/19 20:54











Vital Signs Reviewed: Yes





Diagnostics





- Vital Signs


 Vital Signs











  Temp Pulse Resp BP Pulse Ox


 


 07/23/19 22:37  98.4 F  89  16  132/81  99


 


 07/23/19 22:22   93  16  132/81  99


 


 07/23/19 22:20  97.9 F  83  16  128/83  99


 


 07/23/19 20:54  98.4 F  86  16  137/84  98














- Laboratory


Lab Results: 


 Lab Results











  07/23/19 07/23/19 Range/Units





  21:04 22:20 


 


POC Glucose (mg/dL)  238 H  256 H  ()  mg/dL











Lab Statement: Any lab studies that have been ordered have been reviewed, and 

results considered in the medical decision making process.





- EKG


  ** 2158


Cardiac Rate: NL - 76 BPM


EKG Rhythm: Sinus Rhythm


Summary of EKG Findings: Normal Axis. Normal Interval. No ischemic changes.





Course/Dx


Course Of Treatment: The pt is 48 year old male presenting to [facility] c/o 

syncopal episode 3 hours PTA. He was discharged from [facility] earlier today but 

returned after having a syncopal episode at home. He was eating dinner at home 

and after drinking a bottle of coke he passed out for about 15 minutes. The pt 

was able to remember this episode.  He mentions that he had a syncopal episode 

yesterday as well but today was different because he started seeing black. He 

has not taken his blood sugar medication today and has not been taking it some 

time on the advice of his physician. Test results with no significant 

abnormalities except for POC glucose 238 @ 2104 and 256 @ 2220. The pt refused 

blood work.  An EKG reveals sinus rhythm, normal rate @ 76 BPM, normal axis, 

normal interval, and no ischemic changes. The pt was discharged and advised to 

follow up with cardiologist, Dr. Pichardo,  and primary care physician within 3 

days.





- Diagnoses


Provider Diagnoses: 


 Syncope








Discharge





- Sign-Out/Discharge


Documenting (check all that apply): Patient Departure - discharge


Patient Received Moderate/Deep Sedation with Procedure: No





- Discharge Plan


Condition: Stable


Disposition: HOME


Patient Education Materials:  Syncope (ED)


Referrals: 


[facility] [Outside] - 3 Days


Joe Pichardo MD [Medical Doctor] - 3 Days


Additional Instructions: 


NO SPORTS, NO GYM, AND NO EXERTION UNTIL CLEARED BY CARDIOLOGIST. FOLLOW UP 

WITH CARDIOLOGIST, DR. PICHARDO AND PRIMARY CARE PHYSICIAN WITHIN 3 DAYS. RETURN 

TO ED FOR ANY NEW OR WORSENING SYMPTOMS. 





- Attestation Statements


Document Initiated by Scribe: Yes


Documenting Scribe: NITHIN GALINDO


Provider For Whom Caroline is Documenting (Include Credential): NEIL HENRY MD


Scribe Attestation: 


NITHIN BLAS, scribed for NEIL HENRY MD on 07/24/19 at 0049. 


Status of Scribe Document: Ready

## 2019-07-23 NOTE — CONS
NEUROLOGY CONSULTATION NOTE:

 

DATE OF CONSULT:  07/23/19

 

CONSULTING PROVIDER:  Archana Wells NP

 

REASON FOR CONSULT:  Intermittent right-sided weakness.

 

CHIEF COMPLAINT:  Stiffness of the right side.

 

HISTORY OF PRESENT ILLNESS:  Mr. Bojorquez is a 48-year-old right-handed man who 
has remote lacunar stroke involving the left lenticulostriate branches with 
right-sided deficits, who reported no residual weakness, but over the past 2 
months, he has had intermittent stiffness in the right upper and lower 
extremity with occasional involvement of the right face.  The stiffness is 
triggered when his blood glucose fluctuates. He notices stiffness in the right 
arm.  He noticed some muscle aching in the right forearm and right shoulder.  
His glucose would fluctuate from around the 50s to the 300s.  He denied any new 
focal weakness or paresthesias.  He has had the symptoms for the past 2 months.
  The stiffness can last minutes to days.  He denied any neck pain or 
impairment in his bowel or bladder function.  He takes both aspirin and Plavix 
regularly.  He is also taking atorvastatin regularly.  Currently, the patient 
denied any headaches, visual disturbance, swollen difficulty, speech abnormality
, or weakness on the left side.

 

PAST MEDICAL HISTORY:  Type 2 diabetes, hypertension, dyslipidemia, history of 
stroke in 2015.

 

MEDICATIONS:  Home medications:

1.  Aspirin 81 mg daily.

2.  Glipizide 5 mg p.o. daily.

3.  Simvastatin 10 mg p.o. daily.

4.  Metformin 1000 mg p.o. b.i.d.

5.  Lisinopril 10 mg p.o. daily.

6.  Clopidogrel 75 mg p.o. daily.

7.  Amlodipine 10 mg p.o. daily.

8.  Amiloride 10 mg p.o. daily.

 

ALLERGIES:  No known drug allergies.

 

FAMILY HISTORY:  Mother and brother suffer from strokes.

 

SOCIAL HISTORY:  The patient denied any tobacco or alcohol abuse.  He works as 
a .

 

REVIEW OF SYSTEMS:  A 14-point review of systems was obtained and otherwise 
negative except for what was mentioned in the HPI.

 

PHYSICAL EXAM:  Vitals:  Temperature of 98.1, pulse of 49, respiratory rate of 
14, oxygen saturation 99%, blood pressure 100/63.  General:  Well-nourished, 
well- developed man, in no acute distress.  Head:  Normocephalic, atraumatic.  
Eyes: Conjunctivae/corneas are clear.  Neck:  Supple and symmetrical with no 
carotid bruit.  Lungs are clear to auscultation bilaterally.  Nonlabored 
breathing. Cardiovascular:  Regular rate and rhythm with normal S1, S2.  
Extremities:  Normal range of motion with no cyanosis.  Skin:  No skin lesions 
or laceration.  Psych: Affect is bright and normal mood. Easy to establish 
rapport.  Mental status: Awake, alert, and oriented to person, place, time and 
general circumstances. Speech and language including expression, naming, 
repetition, and comprehension were assessed and found to be normal.  Cranial 
Nerves:  Normal confrontation testing bilaterally.  Pupils are mid range and 
reactive to light.  Normal consensual response.  Sensation is intact on the 
forehead, cheeks, and jaw region. There is no facial droop.  He is able to hear 
throughout the history process. Symmetrical palatal elevation.  Normal strength 
against resistance.  Tongue is symmetrical and midline with no atrophy or 
fasciculation.  Motor Examination:  No abnormal movements with pronator drift.  
Normal tone throughout.  No spasticity. Strength is 5/5 strength in upper and 
lower extremities bilaterally.  Reflexes: Right/left, brachioradialis 2/2, 
biceps 2/2, triceps 2/2, patella 1/1, ankle 1/1, plantar flexor/flexor.  
Sensation is intact to light touch throughout.  Normal vibration and 
proprioception of the great toes.  Coordination:  Normal finger-to- nose and 
rapid alternating movement.  Gait and station:  Narrow based, normal stance, 
and no ataxia.

 

DIAGNOSTIC STUDIES/LAB DATA:  Laboratory data were obtained during this 
hospitalization.  WBC 7.6, hemoglobin of 14.2, hematocrit of 42, platelet count 
of 257,000.  Sodium of 135, potassium of 3.4, chloride of 97, lactic acid of 3.8
, glucose of 153, LDL of 46.  Urinalysis negative for pyuria.  C-reactive 
protein is 1.0.

 

A CT of the head was obtained on 07/22/19.  I personally reviewed the image.  
There is no reported acute intracranial abnormality.  An MRI of the brain 
without contrast showed an old left lacunar stroke.  There is no acute 
intracranial abnormality.  MRA of the head and neck showed no hemodynamically 
significant stenosis or large vessel occlusion.

 

ASSESSMENT AND RECOMMENDATION:  Mr. Gaurav Bojorquez is a 48-year-old man with 
history of remote left lacunar stroke in 2016, who presents with also increase 
in stiffness in the right upper and lower extremity and occasionally involving 
the right face.  His examination is nonfocal.  He has no evidence of any 
sensory abnormality or reflex asymmetry.  The MRI of the brain shows no 
evidence of an acute ischemic stroke. 



Overall, given his clinical presentation with hypo and hyperglycemia inducing 
focal neurological symptoms, I suspect he has a metabolic process that is 
causing a recrudescence of focal neurological deficits.  Another possible cause 
is symptomatic hypotension as his blood pressure is on the lower side of normal 
for someone who has had a stroke in the past.  He could be hypoperfusing the 
area of the previous stroke.

 

Recommendations:  Continue aspirin and Plavix.  Continue atorvastatin.  I am 
not sure why he is on dual antiplatelet therapy but from the neurology 
standpoint, I do not recommend long-term dual antiplatelet therapy.  Therefore, 
I encouraged him to discontinue aspirin after 30 days.  I recommended it for 30 
days since he is currently exhibiting some signs of cerebral hypoperfusion to 
the region of the previous stroke.  Please place holding parameters on all of 
his antihypertensive agents.  Maintain his systolic blood pressure between 120 
to less than 140.  I educated the patient regarding the importance of glycemic 
control.



Please obtain vitamin B12 to rule out any other metabolic causes for his 
symptoms. I have ordered an EEG to evaluate for possible focal motor or sensory 
seizures.  He does not complain of neck pain and has no evidence of myelopathy 
on examination.  Therefore cervical spondylosis with myelopathy is low on the 
differential. However, if he continues to have symptoms, an MRI of the cervical 
spine without contrast can be done as an outpatient. 



Neuro checks every 4 hours.  Pending transthoracic echo, unless he has evidence 
of ventricular or atrial thrombus, the findings on Echo will not change the 
medical management.  He should follow up with Neurology within 6 to 8 weeks as 
an outpatient.  Neurology will sign off but will follow up with ordered tests.

 

 [id number]/CPS #: [id number]

ALLYSON

## 2019-07-24 ENCOUNTER — HOSPITAL ENCOUNTER (EMERGENCY)
Dept: HOSPITAL 25 - ED | Age: 49
Discharge: LEFT BEFORE DISCHARGE | End: 2019-07-24
Payer: SELF-PAY

## 2019-07-24 VITALS — SYSTOLIC BLOOD PRESSURE: 145 MMHG | DIASTOLIC BLOOD PRESSURE: 86 MMHG

## 2019-07-24 DIAGNOSIS — Z53.21: ICD-10-CM

## 2019-07-24 DIAGNOSIS — R73.9: Primary | ICD-10-CM

## 2019-07-24 LAB
ALBUMIN SERPL BCG-MCNC: 4.6 G/DL (ref 3.2–5.2)
ALBUMIN/GLOB SERPL: 1.3 {RATIO} (ref 1–3)
ALP SERPL-CCNC: 53 U/L (ref 34–104)
ALT SERPL W P-5'-P-CCNC: 35 U/L (ref 7–52)
ANION GAP SERPL CALC-SCNC: 8 MMOL/L (ref 2–11)
AST SERPL-CCNC: 26 U/L (ref 13–39)
BASOPHILS # BLD AUTO: 0 10^3/UL (ref 0–0.2)
BUN SERPL-MCNC: 18 MG/DL (ref 6–24)
BUN/CREAT SERPL: 23.4 (ref 8–20)
CALCIUM SERPL-MCNC: 9.8 MG/DL (ref 8.6–10.3)
CHLORIDE SERPL-SCNC: 103 MMOL/L (ref 101–111)
EOSINOPHIL # BLD AUTO: 0.1 10^3/UL (ref 0–0.6)
GLOBULIN SER CALC-MCNC: 3.5 G/DL (ref 2–4)
GLUCOSE SERPL-MCNC: 179 MG/DL (ref 70–100)
HCO3 SERPL-SCNC: 28 MMOL/L (ref 22–32)
HCT VFR BLD AUTO: 40 % (ref 42–52)
HGB BLD-MCNC: 13.9 G/DL (ref 14–18)
LYMPHOCYTES # BLD AUTO: 2.1 10^3/UL (ref 1–4.8)
MCH RBC QN AUTO: 31 PG (ref 27–31)
MCHC RBC AUTO-ENTMCNC: 35 G/DL (ref 31–36)
MCV RBC AUTO: 87 FL (ref 80–94)
MONOCYTES # BLD AUTO: 0.5 10^3/UL (ref 0–0.8)
NEUTROPHILS # BLD AUTO: 4.9 10^3/UL (ref 1.5–7.7)
NRBC # BLD AUTO: 0 10^3/UL
NRBC BLD QL AUTO: 0
PLATELET # BLD AUTO: 265 10^3/UL (ref 150–450)
POTASSIUM SERPL-SCNC: 3.8 MMOL/L (ref 3.5–5)
PROT SERPL-MCNC: 8.1 G/DL (ref 6.4–8.9)
RBC # BLD AUTO: 4.54 10^6 /UL (ref 4.18–5.48)
SODIUM SERPL-SCNC: 139 MMOL/L (ref 135–145)
WBC # BLD AUTO: 7.7 10^3/UL (ref 3.5–10.8)

## 2019-07-24 PROCEDURE — 36415 COLL VENOUS BLD VENIPUNCTURE: CPT

## 2019-07-24 PROCEDURE — 85025 COMPLETE CBC W/AUTO DIFF WBC: CPT

## 2019-07-24 PROCEDURE — 80053 COMPREHEN METABOLIC PANEL: CPT
